# Patient Record
Sex: FEMALE | Race: BLACK OR AFRICAN AMERICAN | Employment: UNEMPLOYED | URBAN - METROPOLITAN AREA
[De-identification: names, ages, dates, MRNs, and addresses within clinical notes are randomized per-mention and may not be internally consistent; named-entity substitution may affect disease eponyms.]

---

## 2021-04-10 ENCOUNTER — TRANSCRIBE ORDER (OUTPATIENT)
Dept: SCHEDULING | Age: 56
End: 2021-04-10

## 2021-04-10 DIAGNOSIS — Z12.31 VISIT FOR SCREENING MAMMOGRAM: Primary | ICD-10-CM

## 2021-04-10 DIAGNOSIS — Z01.419 ENCOUNTER FOR WELL WOMAN EXAM WITH ROUTINE GYNECOLOGICAL EXAM: ICD-10-CM

## 2021-06-23 ENCOUNTER — HOSPITAL ENCOUNTER (OUTPATIENT)
Dept: PHYSICAL THERAPY | Age: 56
End: 2021-06-23

## 2021-07-07 ENCOUNTER — HOSPITAL ENCOUNTER (OUTPATIENT)
Dept: PHYSICAL THERAPY | Age: 56
Discharge: HOME | End: 2021-07-07
Payer: MEDICAID

## 2021-07-07 PROCEDURE — 97162 PT EVAL MOD COMPLEX 30 MIN: CPT

## 2021-07-07 NOTE — PROGRESS NOTES
PT DAILY TREATMENT NOTE     Patient Name: Racheal Hair  Date:2021  : 1965  [x]  Patient  Verified  Payor: [facility] MEDICAID / Plan: [address] / Product Type: Managed Care Medicaid /    In time:427  Out time:508p  Total Treatment Time (min): 41  Visit #: 1 of     Medicare/BCBS Only   Total Timed Codes (min):  7 1:1 Treatment Time:  na       Treatment Area: Right knee pain [M25.561]  Left knee pain [M25.562]    SUBJECTIVE  Pain Level (0-10 scale): 9  Any medication changes, allergies to medications, adverse drug reactions, diagnosis change, or new procedure performed?: [x] No    [] Yes (see summary sheet for update)  Subjective functional status/changes:   [] No changes reported  Pt initial eval see POC for details    OBJECTIVE    Modality rationale: decrease pain to improve the patient's ability to tolerate ADLs   Min Type Additional Details    [] Estim:  []Unatt       []IFC  []Premod                        []Other:  []w/ice   []w/heat  Position:  Location:    [] Estim: []Att    []TENS instruct  []NMES                    []Other:  []w/US   []w/ice   []w/heat  Position:  Location:    []  Traction: [] Cervical       []Lumbar                       [] Prone          []Supine                       []Intermittent   []Continuous Lbs:  [] before manual  [] after manual    []  Ultrasound: []Continuous   [] Pulsed                           []1MHz   []3MHz W/cm2:  Location:    []  Iontophoresis with dexamethasone         Location: [] Take home patch   [] In clinic   4 []  Ice     [x]  heat  []  Ice massage  []  Laser   []  Anodyne Position:long sit  Location:aj knees    []  Laser with stim  []  Other:  Position:  Location:    []  Vasopneumatic Device Pressure:       [] lo [] med [] hi   Temperature: [] lo [] med [] hi   [] Skin assessment post-treatment:  []intact []redness- no adverse reaction    []redness  adverse reaction:     30 min [x]Eval                  []Re-Eval       7 min Therapeutic Exercise:  [] See flow sheet :   Rationale: increase ROM and increase strength to improve the patient's ability to perform functional mobility            With   [] TE   [] TA   [] neuro   [] other: Patient Education: [x] Review HEP    [] Progressed/Changed HEP based on:   [] positioning   [] body mechanics   [] transfers   [] heat/ice application    [] other:      Other Objective/Functional Measures:    Justification for Eval Code Complexity:  Patient History : see POC   Examination see exam   Clinical Presentation: evolving  Clinical Decision Making : FOTO : 31/100    Pain Level (0-10 scale) post treatment: 9    ASSESSMENT/Changes in Function: Pt was instructed in initial HEP required demo, vc, and tc for all exercises to perform correctly. Pt was given hand out detailing exercises and instructed in modification of exercises to tolerance, and in performing exercises safely. Pt verbalized understanding. Patient will continue to benefit from skilled PT services to modify and progress therapeutic interventions, address functional mobility deficits, address ROM deficits, address strength deficits, analyze and address soft tissue restrictions, analyze and cue movement patterns, analyze and modify body mechanics/ergonomics, assess and modify postural abnormalities, address imbalance/dizziness and instruct in home and community integration to attain remaining goals.      [x]  See Plan of Care  []  See progress note/recertification  []  See Discharge Summary         Progress towards goals / Updated goals:  No progress as goals were set today    PLAN  []  Upgrade activities as tolerated     []  Continue plan of care  []  Update interventions per flow sheet       []  Discharge due to:_  []  Other:_        Dinorah Bocanegra 7/7/2021  11:42 AM    Future Appointments   Date Time Provider jR Peña   7/7/2021  4:15 PM [facility]

## 2021-07-07 NOTE — PROGRESS NOTES
[facility] PHYSICAL THERAPY AT [facility]   [address] [facility] [address] [facility], [place] [address]  Phone: [phone] Fax: [phone] / STATEMENT OF MEDICAL NECESSITY FOR PHYSICAL THERAPY SERVICES  Patient Name: Claude Boros : 1965   Medical   Diagnosis: Right knee pain [M25.561]  Left knee pain [M25.562] Treatment Diagnosis: Right knee pain [M25.561]  Left knee pain [M25.562]   Onset Date: May 2021     Referral Source: Daphnie De Jesus MD [facility]): 2021   Prior Hospitalization: See medical history Provider #: [id number]   Prior Level of Function: I in all functional mobility   Comorbidities: OA, asthma, back pain, osteoporosis, HTN, type II diabetes,    Medications: Verified on Patient Summary List   The Plan of Care and following information is based on the information from the initial evaluation.   ========================================================================  Assessment / key information:  Pt is a 50 y.o. F who presents with PINKY knee pain. Current deficits include: dec knee ROM, dec knee and hip strength, dec balance, dec dynamic stability throughout LE kinetic chain. Functional deficits include: impaired walking, standing, transfers, stair navigation, squatting, and ADLs. FOTO score indicates 69% functional impairment. Home exercise program initiated on initial evaluation to address these deficits. Pt would benefit from PT to address these deficits for increased functional mobility and QOL. MOMO:Pt reports that her knees started bothering her back in May 2021 for no specific reason. She feels the knee pain is also related to back pain. She went to the ER due to the pain in her legs where they did x rays she reports showed arthritis. She was prescribed tylenol and flexeril but has only taken either once or twice as she does not like medication. She was also prescribed prednisone for the knees which she has finished, but it did not help. She describes the pain as excruciating, sharp and shooting deep in the center of the bones in her knees. The pain is worse with going up/down stairs, walking (immediate pain), bending over, squatting, getting out of chairs, donning/doffing socks, and standing. The pain is better with sitting still. She has tried heat/ice but they don't seem to help. She denies all red flags. PAIN:  Location-PINKY knees R>L today  Current-9  Best-8  Worst-10  Alleviating factors: sitting still  Aggravating factors:inc activity      OBJECTIVE:    MMT:  Hip   - flex L=3-/5 R=3-/5  - ext L=3/5 R=3/5  - abd L=3-/5 R=3/5    Knee   - flex L=3/5! R=3-/5!  - ext L=3/5! R=3/5! Balance: SLS  R= 5s! L= 10s! Knee AROM: L=0-90deg! R=0-50deg!     Outcome Measure: FOTO=31    Accessory ROM: dec patellar superior and inferior glide PINKY  Palpation:TTP of pinky medial and lateral joint lines, patellar tendons    Special Tests:  - Varus stress test negative  - Valgus stress test negative    ========================================================================  Eval Complexity: History: HIGH Complexity :3+ comorbidities / personal factors will impact the outcome/ POC Exam:MEDIUM Complexity : 3 Standardized tests and measures addressing body structure, function, activity limitation and / or participation in recreation  Presentation: MEDIUM Complexity : Evolving with changing characteristics  Clinical Decision Making:MEDIUM Complexity : FOTO score of 26-74Overall Complexity:MEDIUM  Problem List: pain affecting function, decrease ROM, decrease strength, edema affecting function, impaired gait/ balance, decrease ADL/ functional abilitiies, decrease activity tolerance, decrease flexibility/ joint mobility and decrease transfer abilities   Treatment Plan may include any combination of the following: Therapeutic exercise, Therapeutic activities, Neuromuscular re-education, Physical agent/modality, Gait/balance training, Manual therapy, Patient education, Self Care training, Functional mobility training, Home safety training and Stair training  Patient / Family readiness to learn indicated by: asking questions  Persons(s) to be included in education: patient (P)  Barriers to Learning/Limitations: None  Measures taken:    Patient Goal (s): \"I just need some type of relief to deal with this daily pain\"   Patient self reported health status: fair  Rehabilitation Potential: good  Short Term Goals: To be accomplished in 1 weeks:  1) Goal: Patient will be independent and compliant with HEP in order to progress toward long term goals. Status at last note/certification: issued and reviewed  Long Term Goals: To be accomplished in 8-12 treatments:  1) Goal: Patient will improve FOTO assessment score to 52 pts in order to indicate improved functional abilities. Status at last note/certification: 31 pts  2) Goal: Patient will improve PINKY knee ROM to 0-110 deg for stair navigation  Status at last note/certification: R=2-36YZY! R=0-50deg! 3) Goal: Patient will report overall at least 75% improvement in function in order to progress toward premorbid status. Status at last note/certification: n/a  4) Goal: Patient will demonstrate >=4/5 strength in PINKY hips for improved ability to squat and transfer  Status at last note/certification:  - flex L=3-/5 R=3-/5  - ext L=3/5 R=3/5  - abd L=3-/5 R=3/5    Frequency / Duration:   Patient to be seen  1-2  times per week for 4-6  weeks:  Patient / Caregiver education and instruction: exercises  Therapist Signature: Rick Bocanegra Date: 8/1/1639   Certification Period: na Time: 11:42 AM   ========================================================================  I certify that the above Physical Therapy Services are being furnished while the patient is under my care. I agree with the treatment plan and certify that this therapy is necessary.   Physician Signature:        Date:       Time: ___ Niraj Mcgill MD.    Please sign and return to [facility] or you may fax the signed copy to [phone]. Thank you.

## 2021-08-03 ENCOUNTER — HOSPITAL ENCOUNTER (OUTPATIENT)
Dept: PHYSICAL THERAPY | Age: 56
Discharge: HOME | End: 2021-08-03
Payer: MEDICAID

## 2021-08-05 ENCOUNTER — HOSPITAL ENCOUNTER (OUTPATIENT)
Dept: PHYSICAL THERAPY | Age: 56
End: 2021-08-05
Payer: MEDICAID

## 2021-08-06 ENCOUNTER — HOSPITAL ENCOUNTER (OUTPATIENT)
Dept: PHYSICAL THERAPY | Age: 56
Discharge: HOME | End: 2021-08-06
Payer: MEDICAID

## 2021-08-06 PROCEDURE — 97110 THERAPEUTIC EXERCISES: CPT | Performed by: PHYSICAL THERAPIST

## 2021-08-06 PROCEDURE — 97112 NEUROMUSCULAR REEDUCATION: CPT | Performed by: PHYSICAL THERAPIST

## 2021-08-06 PROCEDURE — 97530 THERAPEUTIC ACTIVITIES: CPT | Performed by: PHYSICAL THERAPIST

## 2021-08-06 NOTE — PROGRESS NOTES
PT DAILY TREATMENT NOTE     Patient Name: Adonis Wood  Date:2021  : 1965  [x]  Patient  Verified  Payor: [facility] MEDICAID / Plan: [address] / Product Type: Managed Care Medicaid /    In time:1235pm  Out time:1:22pm  Total Treatment Time (min): 47  Total Timed Codes (min): 47  1:1 Treatment Time (min): na   Visit #: 2 of     Treatment Area: Right knee pain [M25.561]  Left knee pain [M25.562]    SUBJECTIVE  Pain Level (0-10 scale): 4  Any medication changes, allergies to medications, adverse drug reactions, diagnosis change, or new procedure performed?: [x] No    [] Yes (see summary sheet for update)  Subjective functional status/changes:   [] No changes reported  This pain is all the time in the front of this knee.     OBJECTIVE  Modality rationale: decrease edema, decrease inflammation, decrease pain and increase muscle contraction/control to improve the patients ability to perform functional mobility and improve activity  endurance   Min Type Additional Details    [] Estim: []Att   []Unatt  []TENS instruct                 []IFC  []Premod []NMES                       []Other:  []w/US   []w/ice   []w/heat  Position:  Location:    []  Traction: [] Cervical       []Lumbar                       [] Prone          []Supine                       []Intermittent   []Continuous Lbs:  [] before manual  [] after manual    []  Ultrasound: []Continuous   [] Pulsed                           []1MHz   []3MHz Location:  W/cm2:    []  Iontophoresis with dexamethasone         Location: [] Take home patch   [] In clinic    []  Ice     []  heat  []  Ice massage Position:  Location:    []  Vasopneumatic Device:  If using vaso (only need to measure limb vaso being performed on)      pre-treatment girth :       post-treatment girth :       measured at (landmark location)  Pressure: [] lo [] med [] hi   Temp: [] lo [] med [] hi   [] Skin assessment post-treatment:  []intact []redness- no adverse reaction       []redness  adverse reaction:       15 min Therapeutic Exercise:  [] See flow sheet :assessed on Nustep    Rationale: increase ROM, increase strength, improve coordination, improve balance and increase proprioception to improve the patients ability to perform functional mobility and improve activity  endurance    15 min Therapeutic Activity:  []  See flow sheet :   Rationale:      9 min Neuromuscular Re-education:  []  See flow sheet :   Rationale: increase ROM, increase strength, improve coordination, improve balance and increase proprioception  to improve the patients ability to perform functional mobility and improve activity  endurance      8 min Gait Training: march, side stepping   Rationale:          x min Patient Education: [x] Review HEP    [] Progressed/Changed HEP based on:   [] positioning   [] body mechanics   [] transfers   [] heat/ice application        Other Objective/Functional Measures: initiated POC   75% bridge noted  Decreased strength in PF     Pain Level (0-10 scale) post treatment: 3    ASSESSMENT/Changes in Function: Fair to Good tolerance to treatment today with patient req 100% verbal/tactile cueing and demo for proper form/technique with all newly introduced therex. Challenged with standing therex of sit<> stands and mini squats. Mod cues for toe fwd with side stepping. Cues for slower speed with step ups on 4 in step. Mod fatigue at end of session today but pt deferred CP or MHP to B knees. Patient will continue to benefit from skilled PT services to modify and progress therapeutic interventions, address functional mobility deficits, address ROM deficits, address strength deficits, analyze and address soft tissue restrictions, analyze and cue movement patterns, analyze and modify body mechanics/ergonomics, assess and modify postural abnormalities, address imbalance/dizziness and instruct in home and community integration to attain remaining goals.      []  See Plan of Care  []  See progress note/recertification  []  See Discharge Summary         Progress towards goals / Updated goals: FIRST FU  Short Term Goals: To be accomplished in 1 weeks:  1) Goal: Patient will be independent and compliant with HEP in order to progress toward long term goals. Status at last note/certification: issued and reviewed  [address] be accomplished in 8-12 treatments:  1) Goal: Patient will improve FOTO assessment score to 52 pts in order to indicate improved functional abilities. Status at last note/certification: 31 pts  2) Goal: Patient will improve PINKY knee ROM to 0-110 deg for stair navigation  Status at last note/certification: X=3-65MKB! R=0-50deg! 3) Goal: Patient will report overall at least 75% improvement in function in order to progress toward premorbid status. Status at last note/certification: n/a  4) Goal: Patient will demonstrate >=4/5 strength in PINKY hips for improved ability to squat and transfer  Status at last note/certification:  - flex L=3-/5 R=3-/5  - ext L=3/5 R=3/5  - abd L=3-/5 R=3/5    PLAN  []  Upgrade activities as tolerated     []  Continue plan of care  []  Update interventions per flow sheet       []  Discharge due to:_  [x]  Other:_ assess for post mm soreness.       Cely Monzon, PT 8/6/2021  8:44 AM

## 2021-08-10 ENCOUNTER — APPOINTMENT (OUTPATIENT)
Dept: PHYSICAL THERAPY | Age: 56
End: 2021-08-10
Payer: MEDICAID

## 2021-08-12 ENCOUNTER — APPOINTMENT (OUTPATIENT)
Dept: PHYSICAL THERAPY | Age: 56
End: 2021-08-12
Payer: MEDICAID

## 2021-08-17 ENCOUNTER — APPOINTMENT (OUTPATIENT)
Dept: PHYSICAL THERAPY | Age: 56
End: 2021-08-17
Payer: MEDICAID

## 2021-08-19 ENCOUNTER — APPOINTMENT (OUTPATIENT)
Dept: PHYSICAL THERAPY | Age: 56
End: 2021-08-19
Payer: MEDICAID

## 2021-08-24 ENCOUNTER — APPOINTMENT (OUTPATIENT)
Dept: PHYSICAL THERAPY | Age: 56
End: 2021-08-24
Payer: MEDICAID

## 2021-08-26 ENCOUNTER — APPOINTMENT (OUTPATIENT)
Dept: PHYSICAL THERAPY | Age: 56
End: 2021-08-26
Payer: MEDICAID

## 2021-09-15 ENCOUNTER — HOSPITAL ENCOUNTER (OUTPATIENT)
Dept: PHYSICAL THERAPY | Age: 56
Discharge: HOME | End: 2021-09-15
Payer: MEDICAID

## 2021-09-15 ENCOUNTER — APPOINTMENT (OUTPATIENT)
Dept: PHYSICAL THERAPY | Age: 56
End: 2021-09-15
Payer: MEDICAID

## 2021-09-15 PROCEDURE — 97530 THERAPEUTIC ACTIVITIES: CPT

## 2021-09-15 PROCEDURE — 97110 THERAPEUTIC EXERCISES: CPT

## 2021-09-15 PROCEDURE — 97112 NEUROMUSCULAR REEDUCATION: CPT

## 2021-09-15 NOTE — PROGRESS NOTES
PT DAILY TREATMENT NOTE     Patient Name: Amauri Herrera  Date:9/15/2021  : 1965  [x]  Patient  Verified  Payor: [employer] MEDICAID / Plan: [employer] / Product Type: Managed Care Medicaid /    In time:10:46  Out time:11:43  Total Treatment Time (min): 62  Visit #: 3 of     Medicare/BCBS Only   Total Timed Codes (min):   1:1 Treatment Time:         Treatment Area: Right knee pain [M25.561]  Left knee pain [M25.562]    SUBJECTIVE  Pain Level (0-10 scale): 3  Any medication changes, allergies to medications, adverse drug reactions, diagnosis change, or new procedure performed?: [x] No    [] Yes (see summary sheet for update)  Subjective functional status/changes:   [] No changes reported  \"I have been having a hard time making it in for therapy because 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 \"    OBJECTIVE    Modality rationale: decrease pain to improve the patients ability to relax and sleep following therapy session to improve restorative sleep patterns.     Min Type Additional Details    - Estim:  -Unatt       -IFC  -Premod                        -Other:  -w/ice   -w/heat  Position:   Location:     - Estim: -Att    -TENS instruct  -NMES                    -Other:  -w/US   -w/ice   -w/heat  Position:  Location:    -  Traction: - Cervical       -Lumbar                       - Prone          -Supine                       -Intermittent   -Continuous Lbs:  - before manual  - after manual    -  Ultrasound: -Continuous   - Pulsed                           -1MHz   -3MHz W/cm2:  Location:    -  Iontophoresis with dexamethasone         Location: - Take home patch   - In clinic   7 -  Ice      Position: supine  Location: left knee    -  Laser with stim  -  Other:  Position:  Location:    -  Vasopneumatic Device    -  Right     -  Left  Pre-treatment girth:  Post-treatment girth:  Measured at (location):  Pressure:       - lo - med - hi   Temperature: - lo - med - hi   - Skin assessment post-treatment:  -intact -redness- no adverse reaction    -redness  adverse reaction:       15 min Therapeutic Exercise:  [x] See flow sheet :   Rationale: increase ROM and increase strength to improve the patients ability to perform gait and transfers with improved LE strength and mobility. 25 min Therapeutic Activity:  -  See flow sheet :   Rationale: increase strength, improve coordination, improve balance, and increase proprioception  to improve the patients ability to perform transfers, stair negotiation, and floor <> waist lifts. Patient education: [place], progress toward goals, ensuring more regular attendance for better results     10 min Neuromuscular Re-education:  -  See flow sheet :   Rationale: increase strength, improve coordination, improve balance and increase proprioception  to improve the patients ability to perform stair negotiation and functional mobility in the home/community with improved quadriceps control and decreased falls risk. With   [] TE   [x] TA   [] neuro   [] other: Patient Education: [x] Review HEP    [] Progressed/Changed HEP based on:   [] positioning   [x] body mechanics   [x] transfers   [x] heat/ice application    [] other:      Other Objective/Functional Measures:   Knee AAROM:  Right Knee: 0-114 deg  Left Knee 0-105 deg    LE Strength:   Right (/5) Left (/5)   Hip     Flexion 3+ 3+             Abduction 3 3             Extension 3 3   Knee   Extension 5 5              Flexion 5 5     Gait: increased posterior trunk sway, step through pattern  Functional Squat: 5x STS 13 seconds  Stair Negotiation: reports frequently negotiating stairs laterally, able to negotiate reciprocally in clinic  Balance: SR EO/EC 30\"/30\"  Tandem EO <15\" B  SLS right 4\", left 5\"      Pain Level (0-10 scale) post treatment: 2    ASSESSMENT/Changes in Function:   Patient has attended PT inconsistently for 3 sessions for the treatment of B knee pain.   The patient demonstrates little progress at this time; her progress has been limited by poor compliance with therapy/HEP. She demonstrates improved knee flexion AROM B with decreased pain but continues to demo significant LE weakness. She continues to report significant difficulty with prolonged ambulation, standing balance, and managing pain without use of medication. Additional assessment includes:  Subjective Gains: no improvements noted at this time  Subjective Deficits: significant difficulty with stair negotiation (often performs laterally), intermittent nature of pain (worse with cold weather, rain), difficulty/pain with walking (over uneven terrain, prolonged distances)  % improvement: 0% improvement  Pain   Average: 8-9/10       Best: 3/10     Worst: 10/10  Patient Goal: \"feel better, move better\"  Objective Measures:         Patient will continue to benefit from skilled PT services to modify and progress therapeutic interventions, address functional mobility deficits, address ROM deficits, address strength deficits, analyze and address soft tissue restrictions, analyze and cue movement patterns, analyze and modify body mechanics/ergonomics, assess and modify postural abnormalities and address imbalance/dizziness to attain remaining goals. []  See Plan of Care  []  See progress note/recertification  []  See Discharge Summary         Progress towards goals / Updated goals:  Short Term Goals: To be accomplished in 1 weeks:  1) Goal: Patient will be independent and compliant with HEP in order to progress toward long term goals. Status at last note/certification: issued and reviewed  Current: progressing, reports intermittent compliance every other day (9/15/21)   [address] [facility] be accomplished in 8-12 treatments:  1) Goal: Patient will improve FOTO assessment score to 52 pts in order to indicate improved functional abilities.   Status at last note/certification: 04 HDA   Current: progressing, FOTO 46 pts (9/15/21)  2) Goal: Patient will improve PINKY knee ROM to 0-110 deg for stair navigation  Status at last note/certification: G=0-94HOE!  R=0-50deg! Current: progressing, right knee 0-114 deg, left knee 0-105 deg  3) Goal: Patient will report overall at least 75% improvement in function in order to progress toward premorbid status.   Status at last note/certification: n/a  Current: not met, reports 0% improvement, likely limited due to poor compliance (9/15/21)  4) Goal: Patient will demonstrate >=4/5 strength in PINKY hips for improved ability to squat and transfer  Status at last note/certification:  - flex L=3-/5 R=3-/5  - ext L=3/5 R=3/5  - abd L=3-/5 R=3/5  Current: not met B hip flexion 3+/5; B hip abduction 3/5, B hip extension 3/5    PLAN  [x]  Upgrade activities as tolerated     [x]  Continue plan of care  []  Update interventions per flow sheet       []  Discharge due to:_  []  Other:_      Bautista Vickie 9/15/2021  8:59 AM    Future Appointments   Date Time Provider Rj Peña   9/15/2021 10:45 AM Ignacia Longo [facility]

## 2021-09-15 NOTE — PROGRESS NOTES
107 [facility] AT [address] [place] [address]  Phone: [phone] Fax: [phone]  PROGRESS NOTE  Patient Name: Anais Gaxiola : 1965   Treatment/Medical Diagnosis: Right knee pain [M25.561]  Left knee pain [M25.562]   Referral Source: Asael Mallory MD     Date of Initial Visit: 21 Attended Visits: 3 Missed Visits: 3     SUMMARY OF TREATMENT    Pt is a 50 y.o. F who presents with bilateral knee pain. Treatment has consisted of: Therapeutic exercise, Therapeutic activities, Neuromuscular re-education, Modalities for pain relief, Gait/balance training, Manual therapy, Patient education, and Stair training. CURRENT STATUS  Patient has attended PT inconsistently for 3 sessions for the treatment of B knee pain. The patient demonstrates little progress at this time; her progress has been limited by poor compliance with therapy/HEP. She demonstrates improved knee flexion AROM B with decreased pain but continues to demo significant LE weakness. She continues to report significant difficulty with prolonged ambulation, standing balance, and managing pain without use of medication.  Additional assessment includes:  Subjective Gains: no improvements noted at this time  Subjective Deficits: significant difficulty with stair negotiation (often performs laterally), intermittent nature of pain (worse with cold weather, rain), difficulty/pain with walking (over uneven terrain, prolonged distances)  % improvement: 0% improvement  Pain   Average: 8-9/10                  Best: 3/10                Worst: 10/10  Patient Goal: \"feel better, move better\"  Objective Measures:   Knee AAROM:  Right Knee: 0-114 deg  Left Knee 0-105 deg     LE Strength:    Right (/5) Left (/5)   Hip     Flexion 3+ 3+             Abduction 3 3             Extension 3 3   Knee   Extension 5 5              Flexion 5 5      Gait: increased posterior trunk sway, step through pattern  Functional Squat: 5x STS 13 seconds  Stair Negotiation: reports frequently negotiating stairs laterally, able to negotiate reciprocally in clinic  Balance: SR EO/EC 30\"/30\"  Tandem EO <15\" B  SLS right 4\", left 5\"      Short Term Goals: To be accomplished in 1 weeks:  1) Goal: Patient will be independent and compliant with HEP in order to progress toward long term goals. Status at last note/certification: issued and reviewed  Current: progressing, reports intermittent compliance every other day (9/15/21)   [address] be accomplished in 8-12 treatments:  1) Goal: Patient will improve FOTO assessment score to 52 pts in order to indicate improved functional abilities. Status at last note/certification: 68 WHA   Current: progressing, FOTO 46 pts (9/15/21)  2) Goal: Patient will improve PINKY knee ROM to 0-110 deg for stair navigation  Status at last note/certification: R=5-84CGS!  R=0-50deg! Current: progressing, right knee 0-114 deg, left knee 0-105 deg  3) Goal: Patient will report overall at least 75% improvement in function in order to progress toward premorbid status. Status at last note/certification: n/a  Current: not met, reports 0% improvement, likely limited due to poor compliance (9/15/21)  4) Goal: Patient will demonstrate >=4/5 strength in PINKY hips for improved ability to squat and transfer  Status at last note/certification:  - flex L=3-/5 R=3-/5  - ext L=3/5 R=3/5  - abd L=3-/5 R=3/5  Current: not met B hip flexion 3+/5; B hip abduction 3/5, B hip extension 3/5       New Goals to be achieved in __10__  treatments:  1) Goal: Patient will improve FOTO assessment score to 52 pts in order to indicate improved functional abilities. Status at last note/certification: FOTO 46 pts   2) Goal: Patient will demo B SLS of at least 10 seconds without LOB to improve obstacle negotiation.   Status at last note/certification: right SLS 4\", left 3\"  3) Goal: Patient will report overall at least 75% improvement in function in order to progress toward premorbid status. Status at last note/certification: reports 0% improvement  4) Goal: Patient will demonstrate >=4/5 strength in PINKY hips for improved ability to squat and transfer  Status at last note/certification: B hip flexion 3+/5; B hip abduction 3/5, B hip extension 3/5      RECOMMENDATIONS  Pt to continue with skilled therapy for 2x/week for 10 sessions (assuming improved therapy compliance) to progress strengthening/balance interventions to improve ease with community ambulation and stair negotiation. If you have any questions/comments please contact us directly at [phone]   Thank you for allowing us to assist in the care of your patient. Therapist Signature: Mercy Joe Date: 9/15/2021   Reporting Period  7/7/21-9/15/21 Time: 12:01 PM   NOTE TO PHYSICIAN:  PLEASE COMPLETE THE ORDERS BELOW AND FAX TO   [facility]: [phone]. If you are unable to process this request in 24 hours please contact our office: [phone].  ___ I have read the above report and request that my patient continue as recommended.   ___ I have read the above report and request that my patient continue therapy with the following changes/special instructions:_________________________________________________________   ___ I have read the above report and request that my patient be discharged from therapy.      Physician Signature:        Date:       Time:                                                        Felicia Jennings MD.

## 2021-09-20 ENCOUNTER — APPOINTMENT (OUTPATIENT)
Dept: PHYSICAL THERAPY | Age: 56
End: 2021-09-20
Payer: MEDICAID

## 2021-09-20 ENCOUNTER — HOSPITAL ENCOUNTER (OUTPATIENT)
Dept: PHYSICAL THERAPY | Age: 56
Discharge: HOME | End: 2021-09-20
Payer: MEDICAID

## 2021-09-20 PROCEDURE — 97530 THERAPEUTIC ACTIVITIES: CPT

## 2021-09-20 PROCEDURE — 97112 NEUROMUSCULAR REEDUCATION: CPT

## 2021-09-20 PROCEDURE — 97110 THERAPEUTIC EXERCISES: CPT

## 2021-09-20 NOTE — PROGRESS NOTES
PT DAILY TREATMENT NOTE     Patient Name: Amauri Herrera  Date:2021  : 1965  [x]  Patient  Verified  Payor: BLUE CROSS MEDICAID / Plan: [address] [place] [address] / Product Type: Managed Care Medicaid /    In time:  Out time:   Total Treatment Time (min): 50  Visit #: 4 of -    Medicare/BCBS Only   Total Timed Codes (min):   1:1 Treatment Time:         Treatment Area: Right knee pain [M25.561]  Left knee pain [M25.562]    SUBJECTIVE  Pain Level (0-10 scale): 8/10 in the L knee, R knee 1-2/10  Any medication changes, allergies to medications, adverse drug reactions, diagnosis change, or new procedure performed?: [x] No    [] Yes (see summary sheet for update)  Subjective functional status/changes:   [] No changes reported  Pt reports that she has most difficulty w/ the R knee when going up and down the stairs, the L knee is intermittent. Noted increased pain today after getting off of the bus and walking. Pt reports that the bus schedule cont to be challenged and so she has to modify her schedule to accommodate getting to and from therapy. OBJECTIVE    Modality rationale: decrease pain to improve the patients ability to relax and sleep following therapy session to improve restorative sleep patterns.     Min Type Additional Details    - Estim:  -Unatt       -IFC  -Premod                        -Other:  -w/ice   -w/heat  Position:   Location:     - Estim: -Att    -TENS instruct  -NMES                    -Other:  -w/US   -w/ice   -w/heat  Position:  Location:    -  Traction: - Cervical       -Lumbar                       - Prone          -Supine                       -Intermittent   -Continuous Lbs:  - before manual  - after manual    -  Ultrasound: -Continuous   - Pulsed                           -1MHz   -3MHz W/cm2:  Location:    -  Iontophoresis with dexamethasone         Location: - Take home patch   - In clinic   5 -  heat   Position: supine  Location: B knees    -  Laser with stim  -  Other:  Position:  Location:    -  Vasopneumatic Device    -  Right     -  Left  Pre-treatment girth:  Post-treatment girth:  Measured at (location):  Pressure:       - lo - med - hi   Temperature: - lo - med - hi   X Skin assessment post-treatment:  -intact ; no adverse reaction        20 min Therapeutic Exercise:  [x] See flow sheet : progressed s/l abd to 12 reps, added incline board, and increased reps of heel to toe raises from 1 set to 2 sets of 15. Rationale: increase ROM and increase strength to improve the patients ability to perform gait and transfers with improved LE strength and mobility. 15 min Therapeutic Activity:  -  See flow sheet :   Rationale: increase strength, improve coordination, improve balance, and increase proprioception  to improve the patients ability to perform transfers, stair negotiation, and floor <> waist lifts. Patient education: to cont w/ HEP, reviewed importance of taking breaks as needed to reduce incidence of pain. 10 min Neuromuscular Re-education:  -  See flow sheet :   Rationale: increase strength, improve coordination, improve balance and increase proprioception  to improve the patients ability to perform stair negotiation and functional mobility in the home/community with improved quadriceps control and decreased falls risk. With   [x] TE   [] TA   [] neuro   [] other: Patient Education: [x] Review HEP    [] Progressed/Changed HEP based on:   [x] positioning   [x] body mechanics   [] transfers   [] heat/ice application    [] other:      Other Objective/Functional Measures:     Tandem: L foot in front: 15 seconds  R foot in front: 10 seconds    Knee AROM:  Right Knee: 0-110 deg w/out reports of p! Left Knee 0-105 deg w/ p! Pain Level (0-10 scale) post treatment: 7/10    ASSESSMENT/Changes in Function:   Pt noted to fatigue w/ proximal hip strengthening tasks today.  Min v.c. needed for maintaining hip ext during SL hip abd activity today. Pt w/ intermittent discomfort in the L knee throughout the session, exercises modified as needed to ensure no increased S&S. Pt requires intermittent v.c. on correct technique for all there ex, emphasis on erect posture. Pt w/ fair>good recall. Motivated for participation in therapy and pain reduction. Noted reduced pain post tx. Patient will continue to benefit from skilled PT services to modify and progress therapeutic interventions, address functional mobility deficits, address ROM deficits, address strength deficits, analyze and address soft tissue restrictions, analyze and cue movement patterns, analyze and modify body mechanics/ergonomics, assess and modify postural abnormalities and address imbalance/dizziness to attain remaining goals. [x]  See Plan of Care  []  See progress note/recertification  []  See Discharge Summary         Progress towards goals / Updated goals:  Short Term Goals: To be accomplished in 1 weeks:  1) Goal: Patient will be independent and compliant with HEP in order to progress toward long term goals. Status at last note/certification: issued and reviewed  Current: progressing, reports intermittent compliance  (9/20/21)   Long Term Goals: To be accomplished in 8-12 treatments:  1) Goal: Patient will improve FOTO assessment score to 52 pts in order to indicate improved functional abilities. Status at last note/certification: 07 FTD   Current: progressing, FOTO 46 pts (9/15/21)  2) Goal: Patient will improve PINKY knee ROM to 0-110 deg for stair navigation  Status at last note/certification: [id number]!  R=0-50deg! Current: progressing, 9/20/21: right knee 0-110 d eg, left knee 0-105 deg  3) Goal: Patient will report overall at least 75% improvement in function in order to progress toward premorbid status.   Status at last note/certification: n/a  Current: not met, reports 0% improvement, likely limited due to poor compliance (9/15/21)  4) Goal: Patient will demonstrate >=4/5 strength in PINKY hips for improved ability to squat and transfer  Status at last note/certification:  - flex L=3-/5 R=3-/5  - ext L=3/5 R=3/5  - abd L=3-/5 R=3/5  Current: not met B hip flexion 3+/5; B hip abduction 3/5, B hip extension 3/5    PLAN  [x]  Upgrade activities as tolerated     [x]  Continue plan of care  []  Update interventions per flow sheet       []  Discharge due to:_  []  Other:_      [facility], PT 9/20/2021  1305 PM    Future Appointments   Date Time Provider Rj Peña   9/20/2021 12:15 PM [address] [facility]

## 2021-09-22 ENCOUNTER — APPOINTMENT (OUTPATIENT)
Dept: PHYSICAL THERAPY | Age: 56
End: 2021-09-22
Payer: MEDICAID

## 2021-09-27 ENCOUNTER — APPOINTMENT (OUTPATIENT)
Dept: PHYSICAL THERAPY | Age: 56
End: 2021-09-27
Payer: MEDICAID

## 2021-09-29 ENCOUNTER — APPOINTMENT (OUTPATIENT)
Dept: PHYSICAL THERAPY | Age: 56
End: 2021-09-29
Payer: MEDICAID

## 2021-10-04 ENCOUNTER — HOSPITAL ENCOUNTER (OUTPATIENT)
Dept: PHYSICAL THERAPY | Age: 56
Discharge: HOME | End: 2021-10-04
Payer: MEDICAID

## 2021-10-04 PROCEDURE — 97116 GAIT TRAINING THERAPY: CPT

## 2021-10-04 PROCEDURE — 97110 THERAPEUTIC EXERCISES: CPT

## 2021-10-04 PROCEDURE — 97530 THERAPEUTIC ACTIVITIES: CPT

## 2021-10-04 NOTE — PROGRESS NOTES
PT DAILY TREATMENT NOTE     Patient Name: Arturo Echeverria  Date:10/4/2021  : 1965  [x]  Patient  Verified  Payor: BLUE CROSS MEDICAID / Plan: [employer] / Product Type: Managed Care Medicaid /    In time:12:17  Out time:1:08  Total Treatment Time (min): 51  Visit #: 2 of 10    Medicare/BCBS Only   Total Timed Codes (min):   1:1 Treatment Time:         Treatment Area: Right knee pain [M25.561]  Left knee pain [M25.562]    SUBJECTIVE  Pain Level (0-10 scale): 8  Any medication changes, allergies to medications, adverse drug reactions, diagnosis change, or new procedure performed?: [x] No    [] Yes (see summary sheet for update)  Subjective functional status/changes:   [] No changes reported  \"I always have pain, it just comes and goes with how intense it is. \"    OBJECTIVE      20 min Therapeutic Exercise:  [x] See flow sheet :   Rationale: increase ROM and increase strength to improve the patients ability to perform gait and transfers with improved LE strength and mobility. 20 min Therapeutic Activity:  [x]  See flow sheet :   Rationale: increase strength, improve coordination, improve balance, and increase proprioception  to improve the patients ability to perform transfers, stair negotiation, and floor <> waist lifts. Patient education: BP monitoring, role of increased blood glucose in producing fatigue       11 min Gait Trainin feet with SPC  over level surfaces with CGA to SBA. Cuing for AD management, step through gait, sequencing. 40 feet x 2 of following-  [] March            [x] Lateral                      Rationale: improve safety with household/community ambulation.             With   [] TE   [] TA   [] neuro   [] other: Patient Education: [x] Review HEP    [] Progressed/Changed HEP based on:   [] positioning   [] body mechanics   [] transfers   [] heat/ice application    [] other:      Other Objective/Functional Measures: /80     Pain Level (0-10 scale) post treatment: 6-7    ASSESSMENT/Changes in Function: Patient reports that she continues to have significant difficulty with prolonged standing and stair negotiation at this time. Relatively normal blood pressure reading indicates that potentially elevated blood glucose or medication changes could be contributors to increased fatigue levels. Pt would likely benefit from [facility] for home to partially off-weight painful LE. Patient will continue to benefit from skilled PT services to modify and progress therapeutic interventions, address functional mobility deficits, address ROM deficits, address strength deficits, analyze and address soft tissue restrictions, analyze and cue movement patterns, analyze and modify body mechanics/ergonomics, assess and modify postural abnormalities and address imbalance/dizziness to attain remaining goals. []  See Plan of Care  []  See progress note/recertification  []  See Discharge Summary         Progress towards goals / Updated goals:  1) Goal: Patient will improve FOTO assessment score to 52 pts in order to indicate improved functional abilities. Status at last note/certification: FOTO 46 pts  Current: continues to have quite a bit of difficulty with stair negotiation (10/4/21)   2) Goal: Patient will demo B SLS of at least 10 seconds without LOB to improve obstacle negotiation. Status at last note/certification: right SLS 4\", left 3\"  3) Goal: Patient will report overall at least 75% improvement in function in order to progress toward premorbid status.   Status at last note/certification: reports 0% improvement  Current: pt reports she is unable to determine her progress at this time due to oscillating nature of symptoms (10/4/21)  4) Goal: Patient will demonstrate >=4/5 strength in PINKY hips for improved ability to squat and transfer  Status at last note/certification: B hip flexion 3+/5; B hip abduction 3/5, B hip extension 3/5  Current:        PLAN  [x]  Upgrade activities as tolerated     [x]  Continue plan of care  []  Update interventions per flow sheet       []  Discharge due to:_  []  Other:_      Cheryl Lehman 10/4/2021  9:07 AM    Future Appointments   Date Time Provider Rj Peña   10/4/2021 12:15 PM Wojciech Montano [facility]

## 2021-10-08 ENCOUNTER — APPOINTMENT (OUTPATIENT)
Dept: PHYSICAL THERAPY | Age: 56
End: 2021-10-08
Payer: MEDICAID

## 2021-10-11 ENCOUNTER — APPOINTMENT (OUTPATIENT)
Dept: PHYSICAL THERAPY | Age: 56
End: 2021-10-11
Payer: MEDICAID

## 2021-11-22 NOTE — PROGRESS NOTES
107 [facility] AT [address] [place] [address], [place], [place] [address]  Phone: [phone] Fax [phone] SUMMARY  Patient Name: Parvin Reinoso : 1965   Treatment/Medical Diagnosis: Right knee pain [M25.561]  Left knee pain [M25.562]   Referral Source: Julio Cesar Christian MD     Date of Initial Visit: 21 Attended Visits: 5 Missed Visits: 4     SUMMARY OF TREATMENT   Pt is a 55 y.o. F who presents with bilateral knee pain. Treatment has consisted of: Therapeutic exercise, Therapeutic activities, Neuromuscular re-education, Modalities for pain relief, Gait/balance training, Manual therapy, Patient education, and Stair training.     CURRENT STATUS  Pt attended therapy inconsistently for 5 sessions for treatment of bilateral knee pain. At time of last session, pt continued to demo significantly high pain levels with frequent flare ups. She continued to demo altered gait pattern secondary to pain. She will be discharged at this time secondary to noncompliance without further instruction/assessment. 1) Goal: Patient will improve FOTO assessment score to 52 pts in order to indicate improved functional abilities. Status at last note/certification: FOTO 46 pts  Current: continues to have quite a bit of difficulty with stair negotiation (10/4/21)   2) Goal: Patient will demo B SLS of at least 10 seconds without LOB to improve obstacle negotiation. Status at last note/certification: right SLS 4\", left 3\"  Current: unable to reassess due to noncompliance   3) Goal: Patient will report overall at least 75% improvement in function in order to progress toward premorbid status.   Status at last note/certification: reports 0% improvement  Current: pt reports she is unable to determine her progress at this time due to oscillating nature of symptoms (10/4/21)  4) Goal: Patient will demonstrate >=4/5 strength in PINKY hips for improved ability to squat and transfer  Status at last note/certification: B hip flexion 3+/5; B hip abduction 3/5, B hip extension 3/5  Current: unable to reassess due to noncompliance       RECOMMENDATIONS  Discontinue therapy due to lack of attendance or compliance. If you have any questions/comments please contact us directly at [phone]. Thank you for allowing us to assist in the care of your patient. To ensure we are able to process the patients encounter and avoid risk of your patient receiving a bill for our services, please sign and return this discharge summary by 12/22/21. Therapist Signature: Daysi Andres Date: 12/22/21   Reporting Period: 9/16/21-10/4/21 Time: 10:52 AM   NOTE TO PHYSICIAN:  Your patient's insurance requires this discharge note be signed and returned. PLEASE COMPLETE THE ORDERS BELOW AND RETURN TO:  [facility] PHYSICAL [facility]APY    ___ I have read the above report and request that my patient be discharged from therapy.      Physician Signature:        Date:       Time:                                        Karen Doss MD

## 2021-12-31 ENCOUNTER — TRANSCRIBE ORDER (OUTPATIENT)
Dept: SCHEDULING | Age: 56
End: 2021-12-31

## 2021-12-31 DIAGNOSIS — Z12.39 BREAST CANCER SCREENING: Primary | ICD-10-CM
